# Patient Record
Sex: MALE | Race: OTHER | URBAN - METROPOLITAN AREA
[De-identification: names, ages, dates, MRNs, and addresses within clinical notes are randomized per-mention and may not be internally consistent; named-entity substitution may affect disease eponyms.]

---

## 2018-01-17 ENCOUNTER — INPATIENT (INPATIENT)
Facility: HOSPITAL | Age: 73
LOS: 0 days | Discharge: HOME | End: 2018-01-18
Attending: INTERNAL MEDICINE

## 2018-01-17 DIAGNOSIS — R42 DIZZINESS AND GIDDINESS: ICD-10-CM

## 2018-01-24 DIAGNOSIS — Z96.653 PRESENCE OF ARTIFICIAL KNEE JOINT, BILATERAL: ICD-10-CM

## 2018-01-24 DIAGNOSIS — E78.00 PURE HYPERCHOLESTEROLEMIA, UNSPECIFIED: ICD-10-CM

## 2018-01-24 DIAGNOSIS — R51 HEADACHE: ICD-10-CM

## 2018-01-24 DIAGNOSIS — Z87.442 PERSONAL HISTORY OF URINARY CALCULI: ICD-10-CM

## 2018-01-24 DIAGNOSIS — I48.91 UNSPECIFIED ATRIAL FIBRILLATION: ICD-10-CM

## 2018-01-24 DIAGNOSIS — I10 ESSENTIAL (PRIMARY) HYPERTENSION: ICD-10-CM

## 2018-01-24 DIAGNOSIS — N40.0 BENIGN PROSTATIC HYPERPLASIA WITHOUT LOWER URINARY TRACT SYMPTOMS: ICD-10-CM

## 2018-01-24 DIAGNOSIS — Z87.891 PERSONAL HISTORY OF NICOTINE DEPENDENCE: ICD-10-CM

## 2018-01-24 DIAGNOSIS — R42 DIZZINESS AND GIDDINESS: ICD-10-CM

## 2018-01-24 DIAGNOSIS — E78.5 HYPERLIPIDEMIA, UNSPECIFIED: ICD-10-CM

## 2022-01-16 ENCOUNTER — EMERGENCY (EMERGENCY)
Facility: HOSPITAL | Age: 77
LOS: 0 days | Discharge: HOME | End: 2022-01-16
Attending: EMERGENCY MEDICINE
Payer: COMMERCIAL

## 2022-01-16 VITALS
RESPIRATION RATE: 18 BRPM | TEMPERATURE: 98 F | SYSTOLIC BLOOD PRESSURE: 127 MMHG | HEART RATE: 88 BPM | OXYGEN SATURATION: 95 % | DIASTOLIC BLOOD PRESSURE: 70 MMHG

## 2022-01-16 DIAGNOSIS — S05.01XA INJURY OF CONJUNCTIVA AND CORNEAL ABRASION WITHOUT FOREIGN BODY, RIGHT EYE, INITIAL ENCOUNTER: ICD-10-CM

## 2022-01-16 DIAGNOSIS — H53.8 OTHER VISUAL DISTURBANCES: ICD-10-CM

## 2022-01-16 DIAGNOSIS — H57.11 OCULAR PAIN, RIGHT EYE: ICD-10-CM
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
## 2022-01-16 PROCEDURE — 99283 EMERGENCY DEPT VISIT LOW MDM: CPT

## 2022-01-16 RX ORDER — POLYMYXIN B SULF/TRIMETHOPRIM 10000-1/ML
1 DROPS OPHTHALMIC (EYE) ONCE
Refills: 0 | Status: COMPLETED | OUTPATIENT
Start: 2022-01-16 | End: 2022-01-16

## 2022-01-16 RX ORDER — FLUORESCEIN SODIUM 9 MG
1 STRIP OPHTHALMIC (EYE) ONCE
Refills: 0 | Status: COMPLETED | OUTPATIENT
Start: 2022-01-16 | End: 2022-01-16

## 2022-01-16 RX ADMIN — Medication 1 APPLICATION(S): at 13:39

## 2022-01-16 RX ADMIN — Medication 1 DROP(S): at 13:40

## 2022-01-16 NOTE — ED PROVIDER NOTE - NSFOLLOWUPINSTRUCTIONS_ED_ALL_ED_FT
Follow up with PMD and Opthalmology in 1-2 days.    Corneal Abrasion    The cornea is the clear covering at the front and center of the eye. This very thin tissue is made up of many layers. If a scratch or injury causes the corneal epithelium to come off, it is called a corneal abrasion. Symptoms include eye pain, difficulty keeping eye open, and light sensitivity. Do not drive or operate machinery if your eye is patched.    SEEK MEDICAL CARE IF YOU HAVE THE FOLLOWING SYMPTOMS: discharge from eyes, changes in vision, worsening of symptoms. Follow up with PMD and Opthalmology in 1-2 days.    Corneal Abrasion    The cornea is the clear covering at the front and center of the eye. This very thin tissue is made up of many layers. If a scratch or injury causes the corneal epithelium to come off, it is called a corneal abrasion. Symptoms include eye pain, difficulty keeping eye open, and light sensitivity. Do not drive or operate machinery if your eye is patched.    SEEK MEDICAL CARE IF YOU HAVE THE FOLLOWING SYMPTOMS: discharge from eyes, changes in vision, worsening of symptoms.    Optho teams fu give

## 2022-01-16 NOTE — ED PROVIDER NOTE - CLINICAL SUMMARY MEDICAL DECISION MAKING FREE TEXT BOX
76-year-old male presents to the ED with right eye pain status post toothbrush injury yesterday.  Currently eye is red, tearing, decreased vision.  Patient went to the urgent care today and was referred to the ED for further evaluation for corneal abrasion.  Physical exam noted to have a right conjunctival injection with clear tearing.  Right eye vision 50/20, left eye vision 30/20.  Right eye fluorescein exam noted to have small corneal abrasion noted at the pupil.  Advised NSAIDs for pain relief.  Discharged home with ophthalmology follow-up.

## 2022-01-16 NOTE — ED PROVIDER NOTE - PATIENT PORTAL LINK FT
You can access the FollowMyHealth Patient Portal offered by [facility] by registering at the following website: [email or web address]. By joining [employer]’s FollowMyHealth portal, you will also be able to view your health information using other applications (apps) compatible with our system.

## 2022-01-16 NOTE — ED PROVIDER NOTE - OBJECTIVE STATEMENT
76y M no ppmh presents for eval of R eye pain. Pt was brushing his teeth yesterday when the toothbrush slipped and hit him in his R eye, since has mild burning pain, no aggravating or relieving factors. Associated blurry vision in R eye.

## 2022-01-16 NOTE — ED PROVIDER NOTE - NS ED ROS FT
Constitutional: (-) fever  Eyes/ENT: (+) R eye pain, (+) blurry vision, (-) epistaxis  Cardiovascular: (-) chest pain, (-) syncope  Respiratory: (-) cough, (-) shortness of breath  Gastrointestinal: (-) vomiting, (-) diarrhea  : (-) dysuria, (-) hematuria  Musculoskeletal: (-) neck pain, (-) back pain, (-) joint pain  Integumentary: (-) rash, (-) edema  Neurological: (-) headache, (-) altered mental status  Allergic/Immunologic: (-) pruritus

## 2022-01-16 NOTE — ED PROVIDER NOTE - PHYSICAL EXAMINATION
CONST: NAD  EYES: L eye 20/30, R eye 20/50. Corneal abrasion to R eye on stain. PERRL, EOMI, Sclera and conjunctiva clear.   ENT: No nasal discharge. Oropharynx normal appearing  NECK: Non-tender, no meningeal signs. normal ROM. supple   CARD: S1 S2; No jvd  RESP: Equal BS B/L, No wheezes, rhonchi or rales. No distress  GI: Soft, non-tender, non-distended. normal BS  MS: Normal ROM in all extremities. pulses 2 +. no calf tenderness or swelling  SKIN: Warm, dry, no acute rashes. Good turgor  NEURO: A&Ox3, No focal deficits. Strength 5/5 with no sensory deficits. Steady gait.

## 2022-01-16 NOTE — ED PROVIDER NOTE - NSFOLLOWUPCLINICS_GEN_ALL_ED_FT
[facility]  Ophthalmolgy  [address], [address]  [place] [address]  Phone: [phone]  Fax:

## 2022-01-16 NOTE — ED ADULT TRIAGE NOTE - CHIEF COMPLAINT QUOTE
Last night while patient was brushing his teeth he slipped and scratched his right eye with the toothbrush was seen in  last night and was told to see an eye doctor right away

## 2023-06-07 NOTE — ED ADULT NURSE NOTE - CHIEF COMPLAINT QUOTE
to discharge. Smoking Cessation:Smoking Cessation Counseling  Discussed the risks of smoking tobacco products and the long term sequelae of tobacco use with the patient such as increased risk of heart attack, stroke, peripheral artery disease and cancer. The patient verbalized their understanding and were provided resources if asked. Counseled patient for approximately 3 minutes. Patient Referrals:  No follow-ups on file. Discharge Medications:     Medication List        ASK your doctor about these medications      buPROPion 150 MG extended release tablet  Commonly known as: WELLBUTRIN XL  Take 1 tablet by mouth daily     traZODone 100 MG tablet  Commonly known as: DESYREL  Take 1 tablet by mouth nightly               Discontinued Medications:  Discharge Medication List as of 6/7/2023 12:23 PM          Procedures     Unless otherwise noted below, none. Performed by: Starr Rothman MD   Procedures      Critical Care Time     Patient does not meet Critical Care Time, 0 minutes    Documentation     I am the Primary Clinician of Record: Starr Rothman MD (electronically signed)    (Please note that parts of this dictation were completed with voice recognition software. Quite often unanticipated grammatical, syntax, homophones, and other interpretive errors are inadvertently transcribed by the computer software. Please disregards these errors.  Please excuse any errors that have escaped final proofreading.)        Starr Rothman MD  06/07/23 3429
Last night while patient was brushing his teeth he slipped and scratched his right eye with the toothbrush was seen in  last night and was told to see an eye doctor right away

## 2024-02-02 PROBLEM — Z78.9 OTHER SPECIFIED HEALTH STATUS: Chronic | Status: ACTIVE | Noted: 2022-01-18

## 2024-05-15 PROBLEM — Z00.00 ENCOUNTER FOR PREVENTIVE HEALTH EXAMINATION: Status: ACTIVE | Noted: 2024-05-15

## 2024-05-16 ENCOUNTER — APPOINTMENT (OUTPATIENT)
Dept: PULMONOLOGY | Facility: CLINIC | Age: 79
End: 2024-05-16
Payer: COMMERCIAL

## 2024-05-16 VITALS
HEART RATE: 88 BPM | SYSTOLIC BLOOD PRESSURE: 120 MMHG | WEIGHT: 215 LBS | RESPIRATION RATE: 14 BRPM | OXYGEN SATURATION: 91 % | HEIGHT: 68 IN | DIASTOLIC BLOOD PRESSURE: 68 MMHG | BODY MASS INDEX: 32.58 KG/M2

## 2024-05-16 DIAGNOSIS — Z78.9 OTHER SPECIFIED HEALTH STATUS: ICD-10-CM

## 2024-05-16 DIAGNOSIS — Z86.79 PERSONAL HISTORY OF OTHER DISEASES OF THE CIRCULATORY SYSTEM: ICD-10-CM

## 2024-05-16 DIAGNOSIS — J84.10 PULMONARY FIBROSIS, UNSPECIFIED: ICD-10-CM

## 2024-05-16 DIAGNOSIS — Z87.438 PERSONAL HISTORY OF OTHER DISEASES OF MALE GENITAL ORGANS: ICD-10-CM

## 2024-05-16 DIAGNOSIS — R91.1 SOLITARY PULMONARY NODULE: ICD-10-CM

## 2024-05-16 DIAGNOSIS — Z83.6 FAMILY HISTORY OF OTHER DISEASES OF THE RESPIRATORY SYSTEM: ICD-10-CM

## 2024-05-16 PROCEDURE — G2211 COMPLEX E/M VISIT ADD ON: CPT | Mod: NC,1L

## 2024-05-16 PROCEDURE — 99204 OFFICE O/P NEW MOD 45 MIN: CPT

## 2024-05-16 RX ORDER — LIPASE/PROTEASE/AMYLASE 9K-112.5K
CAPSULE ORAL
Refills: 0 | Status: ACTIVE | COMMUNITY

## 2024-05-16 RX ORDER — AMLODIPINE BESYLATE 5 MG/1
TABLET ORAL
Refills: 0 | Status: ACTIVE | COMMUNITY

## 2024-05-16 RX ORDER — ATORVASTATIN CALCIUM 80 MG/1
TABLET, FILM COATED ORAL
Refills: 0 | Status: ACTIVE | COMMUNITY

## 2024-05-16 RX ORDER — RIVAROXABAN 10 MG/1
10 TABLET, FILM COATED ORAL
Refills: 0 | Status: ACTIVE | COMMUNITY

## 2024-05-16 RX ORDER — TRAMADOL HYDROCHLORIDE 25 MG/1
TABLET, COATED ORAL
Refills: 0 | Status: ACTIVE | COMMUNITY

## 2024-05-16 RX ORDER — AMLODIPINE AND ATORVASTATIN 10; 80 MG/1; MG/1
TABLET, FILM COATED ORAL
Refills: 0 | Status: ACTIVE | COMMUNITY

## 2024-05-16 RX ORDER — PREGABALIN 300 MG/1
CAPSULE ORAL
Refills: 0 | Status: ACTIVE | COMMUNITY

## 2024-05-16 RX ORDER — TAMSULOSIN HYDROCHLORIDE 0.4 MG/1
CAPSULE ORAL
Refills: 0 | Status: ACTIVE | COMMUNITY

## 2024-05-16 RX ORDER — OLMESARTAN MEDOXOMIL 40 MG/1
TABLET, FILM COATED ORAL
Refills: 0 | Status: ACTIVE | COMMUNITY

## 2024-05-16 RX ORDER — BUDESONIDE, GLYCOPYRROLATE, AND FORMOTEROL FUMARATE 160; 9; 4.8 UG/1; UG/1; UG/1
160-9-4.8 AEROSOL, METERED RESPIRATORY (INHALATION)
Refills: 0 | Status: ACTIVE | COMMUNITY

## 2024-05-16 NOTE — REASON FOR VISIT
[Initial] : an initial visit [Abnormal CXR/ Chest CT] : an abnormal CXR/ chest CT [Pulmonary Fibrosis] : pulmonary fibrosis [Shortness of Breath] : shortness of breath

## 2024-05-16 NOTE — DISCUSSION/SUMMARY
[FreeTextEntry1] : Pulmonary fibrosis-UIP reviewed the CAT scan PFTs 6-minute walk Blood test Lung nodule Repeat CAT scan  Hold antifibrotic for now Was on Esbriet

## 2024-05-16 NOTE — HISTORY OF PRESENT ILLNESS
[TextBox_4] : 79 years old presented for above.  He used to see pulmonary in [place] and he was diagnosed with pulmonary fibrosis UIP no biopsy was given Esbriet which she stopped using to due to insurance problem.  He was seen in presence of his wife he denies any shortness of breath no cough.  He reports that he had a CAT scan last in June 2023 and is a was a lung nodule that supposed to follow-up in 6 months but never did he is never smoker his father was lung disease unsure the etiology he denies GERD joint pain he reports skin rash itchy

## 2024-05-16 NOTE — PHYSICAL EXAM
[Normal Oropharynx] : normal oropharynx [No Acute Distress] : no acute distress [Normal Appearance] : normal appearance [No Neck Mass] : no neck mass [No Resp Distress] : no resp distress [Clear to Auscultation Bilaterally] : clear to auscultation bilaterally [No Abnormalities] : no abnormalities [Benign] : benign [Normal Gait] : normal gait [No Clubbing] : no clubbing [No Cyanosis] : no cyanosis [No Edema] : no edema [FROM] : FROM [Normal Color/ Pigmentation] : normal color/ pigmentation [No Focal Deficits] : no focal deficits [Oriented x3] : oriented x3 [Normal Affect] : normal affect [TextBox_54] : BL CRACKLES

## 2024-06-29 ENCOUNTER — OUTPATIENT (OUTPATIENT)
Dept: OUTPATIENT SERVICES | Facility: HOSPITAL | Age: 79
LOS: 1 days | End: 2024-06-29
Payer: COMMERCIAL

## 2024-06-29 ENCOUNTER — RESULT REVIEW (OUTPATIENT)
Age: 79
End: 2024-06-29

## 2024-06-29 DIAGNOSIS — R07.9 CHEST PAIN, UNSPECIFIED: ICD-10-CM

## 2024-06-29 DIAGNOSIS — Z00.8 ENCOUNTER FOR OTHER GENERAL EXAMINATION: ICD-10-CM

## 2024-06-29 PROCEDURE — 71250 CT THORAX DX C-: CPT | Mod: 26

## 2024-06-29 PROCEDURE — 71250 CT THORAX DX C-: CPT

## 2024-06-30 DIAGNOSIS — R07.9 CHEST PAIN, UNSPECIFIED: ICD-10-CM

## 2024-08-12 ENCOUNTER — APPOINTMENT (OUTPATIENT)
Dept: PULMONOLOGY | Facility: CLINIC | Age: 79
End: 2024-08-12

## 2024-10-09 ENCOUNTER — APPOINTMENT (OUTPATIENT)
Dept: PULMONOLOGY | Facility: CLINIC | Age: 79
End: 2024-10-09
Payer: COMMERCIAL

## 2024-10-09 VITALS
DIASTOLIC BLOOD PRESSURE: 70 MMHG | HEART RATE: 84 BPM | OXYGEN SATURATION: 97 % | SYSTOLIC BLOOD PRESSURE: 130 MMHG | BODY MASS INDEX: 32.74 KG/M2 | WEIGHT: 216 LBS | HEIGHT: 68 IN

## 2024-10-09 DIAGNOSIS — J84.10 PULMONARY FIBROSIS, UNSPECIFIED: ICD-10-CM

## 2024-10-09 DIAGNOSIS — R91.1 SOLITARY PULMONARY NODULE: ICD-10-CM

## 2024-10-09 PROCEDURE — 99213 OFFICE O/P EST LOW 20 MIN: CPT

## 2024-10-09 PROCEDURE — G2211 COMPLEX E/M VISIT ADD ON: CPT | Mod: NC
